# Patient Record
Sex: MALE | Race: WHITE | Employment: FULL TIME | URBAN - METROPOLITAN AREA
[De-identification: names, ages, dates, MRNs, and addresses within clinical notes are randomized per-mention and may not be internally consistent; named-entity substitution may affect disease eponyms.]

---

## 2017-11-01 PROBLEM — S32.010A CLOSED COMPRESSION FRACTURE OF L1 LUMBAR VERTEBRA: Status: ACTIVE | Noted: 2017-11-01

## 2017-11-10 PROBLEM — M53.3 SACROILIAC JOINT PAIN: Status: ACTIVE | Noted: 2017-11-10

## 2018-01-19 PROBLEM — M54.16 RADICULOPATHY, LUMBAR REGION: Status: ACTIVE | Noted: 2018-01-19

## 2018-04-10 ENCOUNTER — TELEPHONE (OUTPATIENT)
Dept: ORTHOPEDIC SURGERY | Age: 63
End: 2018-04-10

## 2018-05-14 ENCOUNTER — HOSPITAL ENCOUNTER (OUTPATIENT)
Dept: NEUROLOGY | Age: 63
Discharge: HOME | End: 2018-05-14
Payer: COMMERCIAL

## 2018-05-14 DIAGNOSIS — S32.010G CLOSED COMPRESSION FRACTURE OF L1 LUMBAR VERTEBRA WITH DELAYED HEALING, SUBSEQUENT ENCOUNTER: ICD-10-CM

## 2018-05-14 DIAGNOSIS — M54.16 RADICULOPATHY, LUMBAR REGION: ICD-10-CM

## 2018-05-14 PROCEDURE — 95911 NRV CNDJ TEST 9-10 STUDIES: CPT

## 2018-05-14 PROCEDURE — 95886 MUSC TEST DONE W/N TEST COMP: CPT

## 2018-05-25 PROBLEM — M51.36 BULGING LUMBAR DISC: Status: ACTIVE | Noted: 2018-05-25

## 2018-07-17 ENCOUNTER — OFFICE VISIT (OUTPATIENT)
Dept: PHYSICAL MEDICINE AND REHAB | Age: 63
End: 2018-07-17
Payer: COMMERCIAL

## 2018-07-17 VITALS
SYSTOLIC BLOOD PRESSURE: 144 MMHG | WEIGHT: 239 LBS | DIASTOLIC BLOOD PRESSURE: 83 MMHG | BODY MASS INDEX: 33.46 KG/M2 | HEIGHT: 71 IN | HEART RATE: 111 BPM

## 2018-07-17 DIAGNOSIS — M21.70 LEG LENGTH DIFFERENCE, ACQUIRED: ICD-10-CM

## 2018-07-17 DIAGNOSIS — M54.41 CHRONIC BILATERAL LOW BACK PAIN WITH BILATERAL SCIATICA: ICD-10-CM

## 2018-07-17 DIAGNOSIS — M54.42 CHRONIC BILATERAL LOW BACK PAIN WITH BILATERAL SCIATICA: ICD-10-CM

## 2018-07-17 DIAGNOSIS — M53.3 SACROILIAC JOINT PAIN: Primary | ICD-10-CM

## 2018-07-17 DIAGNOSIS — M47.816 LUMBAR SPONDYLOSIS: ICD-10-CM

## 2018-07-17 DIAGNOSIS — G89.29 CHRONIC BILATERAL LOW BACK PAIN WITH BILATERAL SCIATICA: ICD-10-CM

## 2018-07-17 PROCEDURE — 99214 OFFICE O/P EST MOD 30 MIN: CPT | Performed by: PHYSICAL MEDICINE & REHABILITATION

## 2018-07-17 RX ORDER — HYDROCHLOROTHIAZIDE 25 MG/1
25 TABLET ORAL DAILY
COMMUNITY
Start: 2018-05-21 | End: 2018-07-31

## 2018-07-17 RX ORDER — PREGABALIN 75 MG/1
75 CAPSULE ORAL 2 TIMES DAILY
Qty: 60 CAPSULE | Refills: 2 | Status: SHIPPED | OUTPATIENT
Start: 2018-07-17 | End: 2018-11-08

## 2018-07-17 RX ORDER — AMLODIPINE BESYLATE 10 MG/1
10 TABLET ORAL DAILY
COMMUNITY
Start: 2018-05-18

## 2018-07-17 NOTE — PROGRESS NOTES
Angel Bowens D.O. [facility]  [address] [facility] [place]  Phone: [phone]  Fax: [phone]      7/17/18    Chief Complaint   Patient presents with    Back Pain     FOLLOW UP       HPI:  Catherine Romero is a 61y.o. year old man seen today in follow up regarding back pain. Interval history: Since the last visit the patient was last seen in December 2017. Since last seen he had SI injection with Dr. Mandy Corrales which lasts only about a month for him. He also had RAMBO which did not help at all. Today, the pain is rated Pain Score:   2 where 0 is no pain and 10 is pain as bad as it can be. The pain is located in the low back,  does not radiate and is described as aching. This pain occurs intermittently. The symptoms have been unchanged since onset. Symptoms are exacerbated by shoveling mulch, riding a raft. Factors which relieve the pain include sacroiliac injection. Other associated symptoms include none. Otherwise, the pain assessment has not changed since the last visit. Past Medical History:   Diagnosis Date    Calculus (=stone)     renal    Environmental allergies     H/O renal calculi     Kidney stones     Migraine     stable with meds    Sleep apnea     uses bipap    Stomach burning chronic    Stomach disorder        Past Surgical History:   Procedure Laterality Date    BACK SURGERY  12/14/2017    kypho L1    CYSTOSCOPY  5/16/2014    retrograde pyelogram uteroscopy    FRACTURE SURGERY Right 09/10/2017    IM nailing right femur    LITHOTRIPSY      OTHER SURGICAL HISTORY Left 1/15/2014    cysto.  ESWL stent placement    TONSILLECTOMY         Social History     Social History    Marital status:      Spouse name: N/A    Number of children: N/A    Years of education: N/A     Social History Main Topics    Smoking status: Current Every Day Smoker     Packs/day: 1.00     Types: Cigarettes    Smokeless tobacco:

## 2018-07-30 ENCOUNTER — TELEPHONE (OUTPATIENT)
Dept: PHYSICAL MEDICINE AND REHAB | Age: 63
End: 2018-07-30

## 2018-07-30 DIAGNOSIS — M21.70 ACQUIRED LEG LENGTH DISCREPANCY: Primary | ICD-10-CM

## 2018-07-30 NOTE — TELEPHONE ENCOUNTER
----- Message from Maggie Tariq DO sent at 7/30/2018 12:56 PM EDT -----  Reviewed test abnormal, inform patient that it showed left leg 5  Mm shorter. Needs an in shoe lift.  if he agrees.

## 2018-07-31 ENCOUNTER — OFFICE VISIT (OUTPATIENT)
Dept: PAIN MANAGEMENT | Age: 63
End: 2018-07-31
Payer: COMMERCIAL

## 2018-07-31 VITALS
WEIGHT: 238 LBS | DIASTOLIC BLOOD PRESSURE: 8 MMHG | TEMPERATURE: 99.6 F | SYSTOLIC BLOOD PRESSURE: 148 MMHG | OXYGEN SATURATION: 98 % | BODY MASS INDEX: 33.32 KG/M2 | RESPIRATION RATE: 16 BRPM | HEART RATE: 98 BPM | HEIGHT: 71 IN

## 2018-07-31 DIAGNOSIS — M47.816 LUMBAR SPONDYLOSIS: ICD-10-CM

## 2018-07-31 DIAGNOSIS — M47.816 LUMBAR FACET ARTHROPATHY: Primary | ICD-10-CM

## 2018-07-31 DIAGNOSIS — G89.4 CHRONIC PAIN SYNDROME: ICD-10-CM

## 2018-07-31 DIAGNOSIS — M53.3 DISORDER OF SACRUM: ICD-10-CM

## 2018-07-31 PROBLEM — M21.70 ACQUIRED LEG LENGTH DISCREPANCY: Status: ACTIVE | Noted: 2018-07-31

## 2018-07-31 PROCEDURE — 99204 OFFICE O/P NEW MOD 45 MIN: CPT | Performed by: PAIN MEDICINE

## 2018-07-31 NOTE — PROGRESS NOTES
[address]        [address] [facility], [address] [facility]      [phone]          Consult Note      Patient:  JOHN Davis 1955    Date of Service:  18    Requesting Physician:  Yash Quintanilla DO    Reason for Consult:      Patient presents with complaints of low back/Left buttocks and bilateral leg pain that started 1 years ago after a fall    HISTORY OF PRESENT ILLNESS:      Pain is constant and is described as aching and dull. Pain does not radiate. He  has numbness of both legs and does not have bladder or bowel dysfunction. Alleviating factors include: sitting. Aggravating factors include:  bending, lifting. He has not been on anticoagulation medications to include none and has not been on herbal supplements. He is not diabetic. Imaging:   Lumbar spine MRI 2018  Interval L1 kyphoplasty. The degree of vertebral body collapse is essentially unchanged. There is persistent marrow edema throughout the vertebral body which may be postoperative in nature  Stable mild diffuse spondylosis. No evidence of a focal herniation or significant central canal stenosis at any level  Progressing cystic space occyping lesion within the upper po;e the right kidney  EMG of bilateral lower extremities 2018  #1 diagnostic evidence compatible with the right L5 nerve root   pathology, i.e. Radiculopathy    #2 evidence compatible with axonal peripheral neuropathy, mild   affecting both lower extremities. Previous treatments: Physical Therapy, SIJ injection and medications. .      Past Medical History:   Diagnosis Date    Calculus (=stone)     renal    Environmental allergies     H/O renal calculi     Hypertension     Kidney stones     Migraine     stable with meds    Sleep apnea     uses bipap    Sleep apnea     Stomach burning chronic    Stomach disorder        Past Surgical History:   Procedure Laterality Date    BACK SURGERY 12/14/2017    kypho L1    CYSTOSCOPY  5/16/2014    retrograde pyelogram uteroscopy    FRACTURE SURGERY Right 09/10/2017    IM nailing right femur    LITHOTRIPSY      OTHER SURGICAL HISTORY Left 1/15/2014    cysto. ESWL stent placement    TONSILLECTOMY         Prior to Admission medications    Medication Sig Start Date End Date Taking? Authorizing Provider   amLODIPine (NORVASC) 10 MG tablet Take 10 mg by mouth daily 5/18/18  Yes Historical Provider, MD   pregabalin (LYRICA) 75 MG capsule Take 1 capsule by mouth 2 times daily for 30 days. . 7/17/18 8/16/18 Yes Racheal Jean Baptiste,    Topiramate (TOPAMAX PO) Take by mouth nightly    Yes Historical Provider, MD   sertraline (ZOLOFT) 100 MG tablet Take 100 mg by mouth nightly    Yes Historical Provider, MD   montelukast (SINGULAIR) 10 MG tablet Take 10 mg by mouth nightly. Yes Historical Provider, MD   sucralfate (CARAFATE) 1 GM tablet Take 1 g by mouth daily    Yes Historical Provider, MD       Allergies   Allergen Reactions    Pcn [Penicillins]      unknown    Pcn [Penicillins]      Unknown reaction, told of allergy as child. Social History     Social History    Marital status:      Spouse name: N/A    Number of children: N/A    Years of education: N/A     Occupational History    Not on file. Social History Main Topics    Smoking status: Current Every Day Smoker     Packs/day: 1.00     Types: Cigarettes    Smokeless tobacco: Never Used    Alcohol use 21.0 oz/week     35 Cans of beer per week    Drug use: No    Sexual activity: Not Currently     Other Topics Concern    Not on file     Social History Narrative    ** Merged History Encounter **            History reviewed. No pertinent family history. REVIEW OF SYSTEMS:     Patient specifically denies fever/chills, chest pain, shortness of breath, new bowel or bladder complaints. All other review of systems was negative.     PHYSICAL EXAMINATION:      BP (!) 148/8   Pulse 98   Temp

## 2018-07-31 NOTE — PROGRESS NOTES
7/31/2018    Luis Artist presents to the [address] [place] [address] on 7/31/2018. Keira Cmobs is complaining of pain lower back and legs. The pain is constant. The pain is described as aching, dull, numb and tingling. . Pain is rated today at a 2 on the VAS scale.  He took his last dose of Lyrica this AM.      He has not been on anticoagulation medications to include none and is managed by NA.      BP (!) 148/8   Pulse 98   Temp 99.6 °F (37.6 °C) (Oral)   Resp 16   Ht 5' 11\" (1.803 m)   Wt 238 lb (108 kg)   SpO2 98%   BMI 33.19 kg/m²

## 2018-09-28 ENCOUNTER — OFFICE VISIT (OUTPATIENT)
Dept: PAIN MANAGEMENT | Age: 63
End: 2018-09-28
Payer: COMMERCIAL

## 2018-09-28 VITALS
OXYGEN SATURATION: 96 % | DIASTOLIC BLOOD PRESSURE: 82 MMHG | HEART RATE: 95 BPM | SYSTOLIC BLOOD PRESSURE: 138 MMHG | RESPIRATION RATE: 16 BRPM | TEMPERATURE: 98.9 F

## 2018-09-28 DIAGNOSIS — M51.36 BULGING LUMBAR DISC: ICD-10-CM

## 2018-09-28 DIAGNOSIS — M47.816 LUMBAR FACET ARTHROPATHY: ICD-10-CM

## 2018-09-28 DIAGNOSIS — M47.816 LUMBAR SPONDYLOSIS: ICD-10-CM

## 2018-09-28 DIAGNOSIS — M53.3 DISORDER OF SACRUM: ICD-10-CM

## 2018-09-28 DIAGNOSIS — G89.4 CHRONIC PAIN SYNDROME: Primary | ICD-10-CM

## 2018-09-28 PROCEDURE — 99213 OFFICE O/P EST LOW 20 MIN: CPT | Performed by: PAIN MEDICINE

## 2018-09-28 NOTE — PROGRESS NOTES
9/28/2018    Christell Alpers presents to the [address] [place] [address] on 9/28/2018. Maximiliano Morocho is complaining of pain in lower back. R knee is hurting laterly. The pain is constant. The pain is described as stabbing and gnawing. Pain is rated today at a 2 on the VAS scale. He took his last dose of nothing at this time, Ibuprofen PRN.       He has not been on anticoagulation medications to include none and is managed by NA.      /82   Pulse 95   Temp 98.9 °F (37.2 °C) (Oral)   Resp 16   SpO2 96%

## 2018-09-28 NOTE — PROGRESS NOTES
[address]  [address] [facility], [address] [place] [address] [place]  [phone]    Follow up Note      Theresa Bond     Date of Visit:  9/28/2018    CC:  Patient presents for follow up   Chief Complaint   Patient presents with    Lower Back Pain       HPI:    Pain is unchanged. Change in quality of symptoms:no. Medication side effects:not applicable . Recent diagnostic testing:none. Recent interventional procedures:none. .    He has not been on anticoagulation medications to include none and has not been on herbal supplements. He is not diabetic.     Imaging:   Lumbar spine MRI 01/2018  Interval L1 kyphoplasty. The degree of vertebral body collapse is essentially unchanged. There is persistent marrow edema throughout the vertebral body which may be postoperative in nature  Stable mild diffuse spondylosis. No evidence of a focal herniation or significant central canal stenosis at any level  Progressing cystic space occyping lesion within the upper po;e the right kidney  EMG of bilateral lower extremities 05/2018  #1 diagnostic evidence compatible with the right L5 nerve root   pathology, i.e. Radiculopathy    #2 evidence compatible with axonal peripheral neuropathy, mild   affecting both lower extremities.     Previous treatments: Physical Therapy, SIJ injection and medications. .         Potential Aberrant Drug-Related Behavior:  No    Urine Drug Screening:  None no opioids started     OARRS report:  09/2018 consistent    Past Medical History:   Diagnosis Date    Calculus (=stone)     renal    Environmental allergies     H/O renal calculi     Hypertension     Kidney stones     Migraine     stable with meds    Sleep apnea     uses bipap    Sleep apnea     Stomach burning chronic    Stomach disorder        Past Surgical History:   Procedure Laterality Date    BACK SURGERY  12/14/2017    kypho L1    CYSTOSCOPY  5/16/2014    retrograde pyelogram uteroscopy    FRACTURE SURGERY Right 09/10/2017    IM nailing right femur    LITHOTRIPSY      OTHER SURGICAL HISTORY Left 1/15/2014    cysto. ESWL stent placement    TONSILLECTOMY         Prior to Admission medications    Medication Sig Start Date End Date Taking? Authorizing Provider   amLODIPine (NORVASC) 10 MG tablet Take 10 mg by mouth daily 5/18/18  Yes Historical Provider, MD   Topiramate (TOPAMAX PO) Take by mouth nightly    Yes Historical Provider, MD   sertraline (ZOLOFT) 100 MG tablet Take 100 mg by mouth nightly    Yes Historical Provider, MD   montelukast (SINGULAIR) 10 MG tablet Take 10 mg by mouth nightly. Yes Historical Provider, MD   sucralfate (CARAFATE) 1 GM tablet Take 1 g by mouth daily    Yes Historical Provider, MD   pregabalin (LYRICA) 75 MG capsule Take 1 capsule by mouth 2 times daily for 30 days. . 7/17/18 8/16/18  Renard Charles Jean Baptiste, DO       Allergies   Allergen Reactions    Pcn [Penicillins]      unknown    Pcn [Penicillins]      Unknown reaction, told of allergy as child. Social History     Social History    Marital status:      Spouse name: N/A    Number of children: N/A    Years of education: N/A     Occupational History    Not on file. Social History Main Topics    Smoking status: Current Every Day Smoker     Packs/day: 1.00     Types: Cigarettes    Smokeless tobacco: Never Used    Alcohol use 21.0 oz/week     35 Cans of beer per week    Drug use: No    Sexual activity: Not Currently     Other Topics Concern    Not on file     Social History Narrative    ** Merged History Encounter **            History reviewed. No pertinent family history. REVIEW OF SYSTEMS:     Carbondalemonet Horta denies fever/chills, chest pain, shortness of breath, new bowel or bladder complaints. All other review of systems was negative. PHYSICAL EXAMINATION:      /82   Pulse 95   Temp 98.9 °F (37.2 °C) (Oral)   Resp 16   SpO2 96%     General:       General appearance:   pleasant and well-hydrated. , in mild distress and A & O x3  Build:Overweight  Function:Rises from a seated position with difficulty     HEENT:     Head:normocephalic and atraumatic  Pupils:regular, round and equal.  Sclera: icterus absent,      Lungs:     Breathing:Breathing Pattern: regular, no distress     Abdomen:     Shape:obese and non-distended  Tenderness:none  Guarding:none     Lumbar spine:     Spine inspection:normal   CVA tenderness:No   Palpation:facet loading, left and positive.   Range of motion:abnormal moderately Lateral bending, flexion, extension rotation left and is  painful.     Musculoskeletal:     Trigger points in Paraveteral:absent bilaterally  SI joint tenderness:negative right, positive left              HEMA test:negative right, positive             left  Piriformis tenderness:negative right, negative left  Trochanteric bursa tenderness:negative right, negative left  SLR:negative right, negative left, sitting      Extremities:     Tremors:None bilaterally upper and lower  Range of motion:pain with internal rotation of hip mildly positive right   Intact:Yes  Edema:Normal     Neurological:     Sensory:normal to light touch and a pin prick bilateral lower extremities  Motor:                  Right Quadriceps5/5          Left Quadriceps5/5           Right Gastrocnemius5/5    Left Gastrocnemius5/5  Right Ant Tibialis5/5  Left Ant Tibialis5/5  Reflexes:    Right Quadriceps reflex2+  Left Quadriceps reflex2+  Right Achilles reflex0  Left Achilles reflex0  Gait:antalgic Left leg is shorter than Right      Dermatology:     Skin:no unusual rashes and no skin lesions    Assessment/Plan:  Chronic low back and Left buttocks pain consistent with lumbar facet arthritis type pain and Left sacroiliac joint dysfunction  Patient had seen  and had Left SIJ injection which per patient had helped a lot but it didn't last   Patient is got fitted for a Left shoe lift, per patient it had helped but it didn't last   Will schedule

## 2018-10-17 ENCOUNTER — PREP FOR PROCEDURE (OUTPATIENT)
Dept: PAIN MANAGEMENT | Age: 63
End: 2018-10-17

## 2018-10-18 ENCOUNTER — HOSPITAL ENCOUNTER (OUTPATIENT)
Age: 63
Setting detail: OUTPATIENT SURGERY
Discharge: HOME | End: 2018-10-18
Attending: PAIN MEDICINE | Admitting: PAIN MEDICINE
Payer: COMMERCIAL

## 2018-10-18 ENCOUNTER — HOSPITAL ENCOUNTER (OUTPATIENT)
Dept: OPERATING ROOM | Age: 63
Setting detail: OUTPATIENT SURGERY
Discharge: HOME | End: 2018-10-18
Attending: PAIN MEDICINE
Payer: COMMERCIAL

## 2018-10-18 VITALS
OXYGEN SATURATION: 96 % | HEART RATE: 84 BPM | RESPIRATION RATE: 16 BRPM | SYSTOLIC BLOOD PRESSURE: 155 MMHG | DIASTOLIC BLOOD PRESSURE: 86 MMHG

## 2018-10-18 DIAGNOSIS — M53.3 DISORDER OF SACRUM: ICD-10-CM

## 2018-10-18 PROCEDURE — 6360000002 HC RX W HCPCS: Performed by: PAIN MEDICINE

## 2018-10-18 PROCEDURE — 27096 INJECT SACROILIAC JOINT: CPT | Performed by: PAIN MEDICINE

## 2018-10-18 PROCEDURE — 2709999900 HC NON-CHARGEABLE SUPPLY: Performed by: PAIN MEDICINE

## 2018-10-18 PROCEDURE — 3600000005 HC SURGERY LEVEL 5 BASE: Performed by: PAIN MEDICINE

## 2018-10-18 PROCEDURE — 2500000003 HC RX 250 WO HCPCS: Performed by: PAIN MEDICINE

## 2018-10-18 PROCEDURE — 3209999900 FLUORO FOR SURGICAL PROCEDURES

## 2018-10-18 PROCEDURE — 6360000004 HC RX CONTRAST MEDICATION: Performed by: PAIN MEDICINE

## 2018-10-18 PROCEDURE — 7100000010 HC PHASE II RECOVERY - FIRST 15 MIN: Performed by: PAIN MEDICINE

## 2018-10-18 RX ORDER — LIDOCAINE HYDROCHLORIDE 5 MG/ML
INJECTION, SOLUTION INFILTRATION; INTRAVENOUS PRN
Status: DISCONTINUED | OUTPATIENT
Start: 2018-10-18 | End: 2018-10-18 | Stop reason: HOSPADM

## 2018-10-18 ASSESSMENT — PAIN SCALES - GENERAL
PAINLEVEL_OUTOF10: 0
PAINLEVEL_OUTOF10: 0

## 2018-10-18 ASSESSMENT — PAIN - FUNCTIONAL ASSESSMENT: PAIN_FUNCTIONAL_ASSESSMENT: 0-10

## 2018-10-18 NOTE — H&P
Occupational History    Not on file. Social History Main Topics    Smoking status: Current Every Day Smoker     Packs/day: 1.00     Types: Cigarettes    Smokeless tobacco: Never Used    Alcohol use 21.0 oz/week     35 Cans of beer per week    Drug use: No    Sexual activity: Not Currently     Other Topics Concern    Not on file     Social History Narrative    ** Merged History Encounter **            No family history on file. REVIEW OF SYSTEMS:    CONSTITUTIONAL:  negative for  fevers, chills, sweats and fatigue    RESPIRATORY:  negative for  dry cough, cough with sputum, dyspnea, wheezing and chest pain    CARDIOVASCULAR:  negative for chest pain, dyspnea, palpitations, syncope    GASTROINTESTINAL:  negative for nausea, vomiting, change in bowel habits, diarrhea, constipation and abdominal pain    MUSCULOSKELETAL: negative for muscle weakness    SKIN: negative for itching or rashes. BEHAVIOR/PSYCH:  negative for poor appetite, increased appetite, decreased sleep and poor concentration    All other systems negative      PHYSICAL EXAM:    VITALS:  BP (!) 167/89   Pulse 84   Resp 18   SpO2 95%     CONSTITUTIONAL:  awake, alert, cooperative, no apparent distress, and appears stated age    EYES: PERRLA, EOMI    LUNGS:  No increased work of breathing, no audible wheezing    CARDIOVASCULAR:  regular rate and rhythm    ABDOMEN:  Soft non tender non distended     EXTREMITIES: no signs of clubbing or cyanosis. MUSCULOSKELETAL: negative for flaccid muscle tone or spastic movements. SKIN: gross examination reveals no signs of rashes, or diaphoresis. NEURO: Cranial nerves II-XII grossly intact. No signs of agitated mood.        Assessment/Plan:    Left buttocks pain for Left SIJ injection

## 2018-10-18 NOTE — OP NOTE
injected. The  Joint space was appropriately outlined. Then, after negative aspiration, a solution consisiting of 0.25% marcaine 2 cc and 40 mg DepoMedrol was easily injected. The needle was then removed and the needle insertion site was covered with Band-Aid. Disposition the patient tolerated the procedure well and there were no complications . Vital signs remained stable throughout the procedure. The patient was escorted to the recovery area where they remained until discharge and written discharge instructions for the procedure were given. Plan: Asya Lynch will return to our pain management center as scheduled.      Tammy Ospina MD

## 2018-11-08 ENCOUNTER — OFFICE VISIT (OUTPATIENT)
Dept: PAIN MANAGEMENT | Age: 63
End: 2018-11-08
Payer: COMMERCIAL

## 2018-11-08 ENCOUNTER — PREP FOR PROCEDURE (OUTPATIENT)
Dept: PAIN MANAGEMENT | Age: 63
End: 2018-11-08

## 2018-11-08 VITALS
TEMPERATURE: 100 F | WEIGHT: 240 LBS | RESPIRATION RATE: 18 BRPM | SYSTOLIC BLOOD PRESSURE: 144 MMHG | HEIGHT: 71 IN | HEART RATE: 92 BPM | DIASTOLIC BLOOD PRESSURE: 80 MMHG | BODY MASS INDEX: 33.6 KG/M2 | OXYGEN SATURATION: 97 %

## 2018-11-08 DIAGNOSIS — M53.3 DISORDER OF SACRUM: Primary | ICD-10-CM

## 2018-11-08 DIAGNOSIS — M47.816 LUMBAR FACET ARTHROPATHY: ICD-10-CM

## 2018-11-08 DIAGNOSIS — M47.816 LUMBAR SPONDYLOSIS: ICD-10-CM

## 2018-11-08 DIAGNOSIS — M53.3 DISORDER OF SACRUM: ICD-10-CM

## 2018-11-08 DIAGNOSIS — M51.36 BULGING LUMBAR DISC: Primary | ICD-10-CM

## 2018-11-08 DIAGNOSIS — G89.4 CHRONIC PAIN SYNDROME: ICD-10-CM

## 2018-11-08 PROCEDURE — 99213 OFFICE O/P EST LOW 20 MIN: CPT | Performed by: PAIN MEDICINE

## 2018-11-08 RX ORDER — SODIUM CHLORIDE 0.9 % (FLUSH) 0.9 %
10 SYRINGE (ML) INJECTION EVERY 12 HOURS SCHEDULED
Status: CANCELLED | OUTPATIENT
Start: 2018-11-08

## 2018-11-08 RX ORDER — SODIUM CHLORIDE 0.9 % (FLUSH) 0.9 %
10 SYRINGE (ML) INJECTION PRN
Status: CANCELLED | OUTPATIENT
Start: 2018-11-08

## 2018-11-08 NOTE — PROGRESS NOTES
Raya Lam presents to the [address] [address] on 11/8/2018. Asya Lynch is complaining of pain in his low back into his left buttock . The pain is constant. The pain is described as aching and dull. Pain is rated today at a 4 on the VAS scale. He took his last dose of Tyleol. He has not been on anticoagulation medications to include none and is managed by NA.      BP (!) 144/80   Pulse 92   Temp 100 °F (37.8 °C)   Resp 18   Ht 5' 11\" (1.803 m)   Wt 240 lb (108.9 kg)   SpO2 97%   BMI 33.47 kg/m²      10/18/18    PROCEDURE:  Fluoroscopic guided Left   sacroiliac joint injection with steroid (#1).       CHELSY Douglas

## 2020-07-28 ENCOUNTER — OFFICE VISIT (OUTPATIENT)
Dept: PHYSICAL MEDICINE AND REHAB | Age: 65
End: 2020-07-28
Payer: MEDICARE

## 2020-07-28 VITALS
SYSTOLIC BLOOD PRESSURE: 152 MMHG | WEIGHT: 237 LBS | HEART RATE: 82 BPM | BODY MASS INDEX: 33.18 KG/M2 | DIASTOLIC BLOOD PRESSURE: 82 MMHG | HEIGHT: 71 IN

## 2020-07-28 PROCEDURE — 3017F COLORECTAL CA SCREEN DOC REV: CPT | Performed by: PHYSICAL MEDICINE & REHABILITATION

## 2020-07-28 PROCEDURE — G8417 CALC BMI ABV UP PARAM F/U: HCPCS | Performed by: PHYSICAL MEDICINE & REHABILITATION

## 2020-07-28 PROCEDURE — 4004F PT TOBACCO SCREEN RCVD TLK: CPT | Performed by: PHYSICAL MEDICINE & REHABILITATION

## 2020-07-28 PROCEDURE — G8427 DOCREV CUR MEDS BY ELIG CLIN: HCPCS | Performed by: PHYSICAL MEDICINE & REHABILITATION

## 2020-07-28 PROCEDURE — 1123F ACP DISCUSS/DSCN MKR DOCD: CPT | Performed by: PHYSICAL MEDICINE & REHABILITATION

## 2020-07-28 PROCEDURE — 4040F PNEUMOC VAC/ADMIN/RCVD: CPT | Performed by: PHYSICAL MEDICINE & REHABILITATION

## 2020-07-28 PROCEDURE — 99214 OFFICE O/P EST MOD 30 MIN: CPT | Performed by: PHYSICAL MEDICINE & REHABILITATION

## 2020-07-28 NOTE — PROGRESS NOTES
Thalia Us D.O. [place] [facility]  [address] [facility] [place]  Phone: [phone]  Fax: [phone]        7/28/20    Chief Complaint   Patient presents with    Back Pain     FOLLOW UP       HPI:  Osei Yarbrough is a 72y.o. year old woman seen today in follow up regarding low back pain. Interval history: Since the last visit the patient saw pain management and had left sacroiliac injection and was planning for ablation but his insurance denied. He has new insurance now. Today, the pain is rated Pain Score:   7 where 0 is no pain and 10 is pain as bad as it can be. The pain is located in the low back,  radiates distally to the bilateral legs, and is described as aching. This pain occurs all day. The symptoms have been worse since onset. Symptoms are exacerbated by walking. Factors which relieve the pain include nothing. Other associated symptoms include stiffness. Otherwise, the pain assessment has not changed since the last visit. Past Medical History:   Diagnosis Date    Calculus (=stone)     renal    Environmental allergies     H/O renal calculi     Hypertension     Kidney stones     Migraine     stable with meds    Sleep apnea     uses bipap    Sleep apnea     Stomach burning chronic    Stomach disorder        Past Surgical History:   Procedure Laterality Date    BACK SURGERY  12/14/2017    kypho L1    CYSTOSCOPY  5/16/2014    retrograde pyelogram uteroscopy    FRACTURE SURGERY Right 09/10/2017    IM nailing right femur    LITHOTRIPSY      NERVE BLOCK Left 10/18/2018    S I joint    OTHER SURGICAL HISTORY Left 1/15/2014    cysto.  ESWL stent placement    MO INJECT SI JOINT ARTHRGRPHY&/ANES/STEROID W/IMAGE Left 10/18/2018    LEFT STEROID INJECTION JOINT INJECTION performed by Keeley Rivera MD at [address] [place] History     Tobacco Use    Smoking status: Current Every Day Smoker     Packs/day: 1.00     Types: Cigarettes    Smokeless tobacco: Never Used   Substance Use Topics    Alcohol use: Yes     Alcohol/week: 35.0 standard drinks     Types: 35 Cans of beer per week    Drug use: No       History reviewed. No pertinent family history. Current Outpatient Medications   Medication Sig Dispense Refill    amLODIPine (NORVASC) 10 MG tablet Take 10 mg by mouth daily      Topiramate (TOPAMAX PO) Take by mouth nightly       sertraline (ZOLOFT) 100 MG tablet Take 100 mg by mouth nightly       montelukast (SINGULAIR) 10 MG tablet Take 10 mg by mouth nightly.  sucralfate (CARAFATE) 1 GM tablet Take 1 g by mouth daily        No current facility-administered medications for this visit. Allergies   Allergen Reactions    Pcn [Penicillins]      unknown    Pcn [Penicillins]      Unknown reaction, told of allergy as child. Review of Systems:  No new weakness, paresthesia, incontinence of bowel or bladder, saddle anesthesia, falls or gait dysfunction. Otherwise, per HPI. Physical Exam:   Blood pressure (!) 152/82, pulse 82, height 5' 11\" (1.803 m), weight 237 lb (107.5 kg). GENERAL: The patient is in no apparent distress. Body habitus is non-obese. HEENT: No rhinorrhea, sneezing, yawning, or lacrimation. No scleral icterus or conjunctival injection. SKIN: No piloerection. No tract marks. No rash. PSYCH: Mood and affect are appropriate. Hygiene is appropriate. CARDIOVASCULAR  Heart is regular rate and rhythm. There is no edema. RESPIRATORY: Respirations are regular and unlabored. There is no cyanosis. GASTROINTESTINAL: Soft abdomen, non-tender. MSK: There is no joint effusion, deformity, instability, swelling, erythema or warmth. AROM is full in the spine and extremities. Spinal curvatures are normal. Tender to palpation bilateral lumbar paraspinals and bilateral SI joints. NEURO: Gait is normal. No focal sensorimotor deficit.   Reflexes 2+ and symmetric in lower extremities. Impression:   1. Sacroiliac joint pain    2. Radiculopathy, lumbar region    3. Closed compression fracture of first lumbar vertebra, sequela    4. Closed fracture of right femur with routine healing, unspecified fracture morphology, unspecified portion of femur, subsequent encounter        Plan:  Orders Placed This Encounter   Procedures    External Referral To Pain Clinic     Referral Priority:   Routine     Referral Type:   Eval and Treat     Referral Reason:   Specialty Services Required     Requested Specialty:   Pain Management     Number of Visits Requested:   Zoya Damon MD, Pain Medicine, [place]     Referral Priority:   Routine     Referral Type:   Eval and Treat     Referral Reason:   Specialty Services Required     Referred to Provider:   Karin Quan MD     Requested Specialty:   PAIN MEDICINE INTERVENTIONAL PAIN MEDICINE     Number of Visits Requested:   1     As explained to patient at the last visit, he is beyond the point that I have anything to offer him. He has exhausted conservative care. He was referred to pain management but then stopped going after I last saw him due to insurance. He now is requesting two pain management opinions. The patient was educated about the diagnosis, prognosis, indications, risks and benefits of treatment. An opportunity to ask questions was given to the patient and questions were answered. The patient agreed to proceed with the recommended treatment as described above. No follow up needed. Thank you for allowing me to participate in the care of your patient. Spring Pascual D.O., P.T.   Board Certified Physical Medicine and Rehabilitation  Board Certified Electrodiagnostic Medicine

## 2020-08-11 ENCOUNTER — OFFICE VISIT (OUTPATIENT)
Dept: PAIN MANAGEMENT | Age: 65
End: 2020-08-11
Payer: MEDICARE

## 2020-08-11 ENCOUNTER — PREP FOR PROCEDURE (OUTPATIENT)
Dept: PAIN MANAGEMENT | Age: 65
End: 2020-08-11

## 2020-08-11 VITALS
DIASTOLIC BLOOD PRESSURE: 78 MMHG | BODY MASS INDEX: 33.18 KG/M2 | TEMPERATURE: 98.9 F | WEIGHT: 237 LBS | HEART RATE: 80 BPM | HEIGHT: 71 IN | RESPIRATION RATE: 16 BRPM | SYSTOLIC BLOOD PRESSURE: 152 MMHG

## 2020-08-11 PROBLEM — M51.9 LUMBAR DISC DISORDER: Status: ACTIVE | Noted: 2020-08-11

## 2020-08-11 PROCEDURE — 1123F ACP DISCUSS/DSCN MKR DOCD: CPT | Performed by: PAIN MEDICINE

## 2020-08-11 PROCEDURE — 3017F COLORECTAL CA SCREEN DOC REV: CPT | Performed by: PAIN MEDICINE

## 2020-08-11 PROCEDURE — G8427 DOCREV CUR MEDS BY ELIG CLIN: HCPCS | Performed by: PAIN MEDICINE

## 2020-08-11 PROCEDURE — G8417 CALC BMI ABV UP PARAM F/U: HCPCS | Performed by: PAIN MEDICINE

## 2020-08-11 PROCEDURE — 99213 OFFICE O/P EST LOW 20 MIN: CPT | Performed by: PAIN MEDICINE

## 2020-08-11 PROCEDURE — 99214 OFFICE O/P EST MOD 30 MIN: CPT | Performed by: PAIN MEDICINE

## 2020-08-11 PROCEDURE — 4040F PNEUMOC VAC/ADMIN/RCVD: CPT | Performed by: PAIN MEDICINE

## 2020-08-11 PROCEDURE — 4004F PT TOBACCO SCREEN RCVD TLK: CPT | Performed by: PAIN MEDICINE

## 2020-08-11 NOTE — PROGRESS NOTES
[address] [place] [address], [address] [place] [address] Jose  [phone]    Follow up Note      Yury Lam     Date of Visit:  8/11/2020    CC:  Patient presents for follow up   Chief Complaint   Patient presents with    Follow-up    Back Pain       HPI:  Patient is here to re-establish care with the practice with complaints of low back pain with no radiation. Patient was last seen by me 11/2018. Medication side effects:not applicable . Recent diagnostic testing:none. Recent interventional procedures:none    He has not been on anticoagulation medications to include none and has not been on herbal supplements. He is not diabetic.     Imaging:     Lumbar spine MRI 01/2018  Interval L1 kyphoplasty. The degree of vertebral body collapse is essentially unchanged. There is persistent marrow edema throughout the vertebral body which may be postoperative in nature  Stable mild diffuse spondylosis. No evidence of a focal herniation or significant central canal stenosis at any level  Progressing cystic space occyping lesion within the upper pole the right kidney    EMG of bilateral lower extremities 05/2018  #1 diagnostic evidence compatible with the right L5 nerve root   pathology, i.e. Radiculopathy    #2 evidence compatible with axonal peripheral neuropathy, mild   affecting both lower extremities.     Previous treatments: Physical Therapy, SIJ injection and medications. .         Potential Aberrant Drug-Related Behavior:  No    Urine Drug Screening:  None no opioids started     OARRS report:  08/2020 consistent    Past Medical History:   Diagnosis Date    Calculus (=stone)     renal    Environmental allergies     H/O renal calculi     Hypertension     Kidney stones     Migraine     stable with meds    Sleep apnea     uses bipap    Sleep apnea     Stomach burning chronic    Stomach disorder      Past Surgical History:   Procedure Laterality Date    BACK SURGERY 12/14/2017    kypho L1    CYSTOSCOPY  5/16/2014    retrograde pyelogram uteroscopy    FRACTURE SURGERY Right 09/10/2017    IM nailing right femur    LITHOTRIPSY      NERVE BLOCK Left 10/18/2018    S I joint    OTHER SURGICAL HISTORY Left 1/15/2014    cysto. ESWL stent placement    VA INJECT SI JOINT ARTHRGRPHY&/ANES/STEROID W/IMAGE Left 10/18/2018    LEFT STEROID INJECTION JOINT INJECTION performed by Damián Santana MD at [facility]       Prior to Admission medications    Medication Sig Start Date End Date Taking? Authorizing Provider   amLODIPine (NORVASC) 10 MG tablet Take 10 mg by mouth daily 5/18/18  Yes Historical Provider, MD   Topiramate (TOPAMAX PO) Take by mouth nightly    Yes Historical Provider, MD   sertraline (ZOLOFT) 100 MG tablet Take 100 mg by mouth nightly    Yes Historical Provider, MD   montelukast (SINGULAIR) 10 MG tablet Take 10 mg by mouth nightly. Yes Historical Provider, MD   sucralfate (CARAFATE) 1 GM tablet Take 1 g by mouth daily    Yes Historical Provider, MD     Allergies   Allergen Reactions    Pcn [Penicillins]      unknown    Pcn [Penicillins]      Unknown reaction, told of allergy as child. Social History     Socioeconomic History    Marital status:      Spouse name: Not on file    Number of children: Not on file    Years of education: Not on file    Highest education level: Not on file   Occupational History    Not on file   Social Needs    Financial resource strain: Not on file    Food insecurity     Worry: Not on file     Inability: Not on file    Transportation needs     Medical: Not on file     Non-medical: Not on file   Tobacco Use    Smoking status: Current Every Day Smoker     Packs/day: 1.00     Types: Cigarettes    Smokeless tobacco: Never Used   Substance and Sexual Activity    Alcohol use:  Yes     Alcohol/week: 35.0 standard drinks     Types: 35 Cans of beer per week    Drug use: No    Sexual activity: Not Currently Lifestyle    Physical activity     Days per week: Not on file     Minutes per session: Not on file    Stress: Not on file   Relationships    Social connections     Talks on phone: Not on file     Gets together: Not on file     Attends Rastafari service: Not on file     Active member of club or organization: Not on file     Attends meetings of clubs or organizations: Not on file     Relationship status: Not on file    Intimate partner violence     Fear of current or ex partner: Not on file     Emotionally abused: Not on file     Physically abused: Not on file     Forced sexual activity: Not on file   Other Topics Concern    Not on file   Social History Narrative    ** Merged History Encounter **          No family history on file. REVIEW OF SYSTEMS:     Ender House denies fever/chills, chest pain, shortness of breath, new bowel or bladder complaints. All other review of systems was negative. PHYSICAL EXAMINATION:      BP (!) 152/78   Pulse 80   Temp 98.9 °F (37.2 °C) (Oral)   Resp 16   Ht 5' 11\" (1.803 m)   Wt 237 lb (107.5 kg)   BMI 33.05 kg/m²     General:       General appearance:   pleasant and well-hydrated. , in mild distress and A & O x3  Build:Overweight  Function:Rises from a seated position with difficulty     HEENT:     Head:normocephalic and atraumatic  Sclera: icterus absent,      Lungs:     Breathing:Breathing Pattern: regular, no distress     Abdomen:     Shape:obese and non-distended  Tenderness:none     Lumbar spine:     Spine inspection:normal   CVA tenderness:No   Palpation:facet loading, left and positive.   Range of motion:abnormal moderately Lateral bending, flexion, extension rotation left and is  painful.     Musculoskeletal:     Trigger points in Paraveteral:absent bilaterally  SI joint tenderness:negative right, negative left              HEMA test:negative right, negative left  Piriformis tenderness:negative right, negative left  Trochanteric bursa tenderness:negative right, negative left  SLR:negative right, negative left, sitting      Extremities:     Tremors:None bilaterally upper and lower  Range of motion:pain with internal rotation of hip mildly positive right   Intact:Yes  Edema:Normal     Neurological:     Sensory:normal to light touch and a pin prick bilateral lower extremities  Motor:                  Right Quadriceps5/5          Left Quadriceps5/5           Right Gastrocnemius5/5    Left Gastrocnemius5/5  Right Ant Tibialis5/5  Left Ant Tibialis5/5  Reflexes:    Right Quadriceps reflex2+  Left Quadriceps reflex2+  Right Achilles reflex +1  Left Achilles reflex0  Gait:antalgic Left leg is shorter than Right      Dermatology:     Skin:no unusual rashes and no skin lesions    Assessment/Plan:  Chronic low back and Left buttocks pain consistent with lumbar facet arthritis type pain and Left sacroiliac joint dysfunction  Patient had seen  and had Left SIJ injection which per patient had helped a lot but it didn't last   L1 Kyphoplasty. Plan:  Patient is here to re-establish care with the practice with complaints of Left sided low back pain with no radiation. Patient was last seen by me 11/2018. Reviewed lumbar spine imaging studies and discussed with the patient (lumbar facet arthropathy)  Discussed diagnostic facet MBB. Will schedule for Left L3,4 MB and L5 DR block  Continue with OTC pain medications(not a candidate for opioids)  OARRS report reviewed 08/2020. Patient encouraged to stay active and to lose weight. Treatment plan discussed with the patient including procedure side effects. ccreferring sonal Beth M.D.

## 2020-08-11 NOTE — PROGRESS NOTES
Do you currently have any of the following:    Fever: No  Headache:  No  Cough: No  Shortness of breath: No  Exposed to anyone with these symptoms: No                                                                                                                Kip Older presents to the [facility] on 8/11/2020. Shira Ojeda is complaining of pain low back pain. The pain is constant. The pain is described as colicky, stabbing, dull, tender and miserable. Pain is rated on his best day at a 7, on his worst day at a 9, and on average at a 8 on the VAS scale. He took his last dose of Tylenol yesterday. Shira Ojeda does not have issues with constipation. Any procedures since your last visit: No    He is not on NSAIDS and  is not on anticoagulation medications to include none. Pacemaker or defibrilator: No.       BP (!) 152/78   Pulse 80   Temp 98.9 °F (37.2 °C) (Oral)   Resp 16   Ht 5' 11\" (1.803 m)   Wt 237 lb (107.5 kg)   BMI 33.05 kg/m²      No LMP for male patient.

## 2020-11-03 PROBLEM — M47.816 LUMBAR FACET ARTHROPATHY: Status: RESOLVED | Noted: 2018-07-31 | Resolved: 2020-11-03

## 2024-08-30 ENCOUNTER — APPOINTMENT (OUTPATIENT)
Dept: RADIOLOGY | Facility: HOSPITAL | Age: 69
End: 2024-08-30
Payer: MEDICARE

## 2024-08-30 ENCOUNTER — HOSPITAL ENCOUNTER (EMERGENCY)
Facility: HOSPITAL | Age: 69
Discharge: HOME | End: 2024-08-30
Attending: STUDENT IN AN ORGANIZED HEALTH CARE EDUCATION/TRAINING PROGRAM
Payer: MEDICARE

## 2024-08-30 VITALS
WEIGHT: 225 LBS | HEART RATE: 96 BPM | DIASTOLIC BLOOD PRESSURE: 77 MMHG | OXYGEN SATURATION: 94 % | RESPIRATION RATE: 17 BRPM | SYSTOLIC BLOOD PRESSURE: 144 MMHG | BODY MASS INDEX: 32.21 KG/M2 | HEIGHT: 70 IN

## 2024-08-30 DIAGNOSIS — K70.31 ALCOHOLIC CIRRHOSIS OF LIVER WITH ASCITES (MULTI): ICD-10-CM

## 2024-08-30 DIAGNOSIS — M54.50 ACUTE LOW BACK PAIN WITHOUT SCIATICA, UNSPECIFIED BACK PAIN LATERALITY: Primary | ICD-10-CM

## 2024-08-30 LAB
ALBUMIN SERPL BCP-MCNC: 3.8 G/DL (ref 3.4–5)
ALP SERPL-CCNC: 224 U/L (ref 33–136)
ALT SERPL W P-5'-P-CCNC: 12 U/L (ref 10–52)
ANION GAP SERPL CALC-SCNC: 13 MMOL/L (ref 10–20)
APPEARANCE UR: CLEAR
AST SERPL W P-5'-P-CCNC: 49 U/L (ref 9–39)
BASOPHILS # BLD AUTO: 0.09 X10*3/UL (ref 0–0.1)
BASOPHILS NFR BLD AUTO: 0.7 %
BILIRUB SERPL-MCNC: 1.6 MG/DL (ref 0–1.2)
BILIRUB UR STRIP.AUTO-MCNC: NEGATIVE MG/DL
BUN SERPL-MCNC: 5 MG/DL (ref 6–23)
CALCIUM SERPL-MCNC: 9 MG/DL (ref 8.6–10.3)
CHLORIDE SERPL-SCNC: 96 MMOL/L (ref 98–107)
CO2 SERPL-SCNC: 25 MMOL/L (ref 21–32)
COLOR UR: ABNORMAL
CREAT SERPL-MCNC: 0.44 MG/DL (ref 0.5–1.3)
EGFRCR SERPLBLD CKD-EPI 2021: >90 ML/MIN/1.73M*2
EOSINOPHIL # BLD AUTO: 0.05 X10*3/UL (ref 0–0.7)
EOSINOPHIL NFR BLD AUTO: 0.4 %
ERYTHROCYTE [DISTWIDTH] IN BLOOD BY AUTOMATED COUNT: 13.2 % (ref 11.5–14.5)
GLUCOSE SERPL-MCNC: 124 MG/DL (ref 74–99)
GLUCOSE UR STRIP.AUTO-MCNC: NORMAL MG/DL
HCT VFR BLD AUTO: 36.7 % (ref 41–52)
HGB BLD-MCNC: 12.8 G/DL (ref 13.5–17.5)
HOLD SPECIMEN: NORMAL
IMM GRANULOCYTES # BLD AUTO: 0.05 X10*3/UL (ref 0–0.7)
IMM GRANULOCYTES NFR BLD AUTO: 0.4 % (ref 0–0.9)
KETONES UR STRIP.AUTO-MCNC: NEGATIVE MG/DL
LEUKOCYTE ESTERASE UR QL STRIP.AUTO: NEGATIVE
LIPASE SERPL-CCNC: 11 U/L (ref 9–82)
LYMPHOCYTES # BLD AUTO: 1.5 X10*3/UL (ref 1.2–4.8)
LYMPHOCYTES NFR BLD AUTO: 12.3 %
MCH RBC QN AUTO: 35.8 PG (ref 26–34)
MCHC RBC AUTO-ENTMCNC: 34.9 G/DL (ref 32–36)
MCV RBC AUTO: 103 FL (ref 80–100)
MONOCYTES # BLD AUTO: 1.07 X10*3/UL (ref 0.1–1)
MONOCYTES NFR BLD AUTO: 8.8 %
NEUTROPHILS # BLD AUTO: 9.46 X10*3/UL (ref 1.2–7.7)
NEUTROPHILS NFR BLD AUTO: 77.4 %
NITRITE UR QL STRIP.AUTO: NEGATIVE
NRBC BLD-RTO: 0 /100 WBCS (ref 0–0)
PH UR STRIP.AUTO: 7 [PH]
PLATELET # BLD AUTO: 246 X10*3/UL (ref 150–450)
POTASSIUM SERPL-SCNC: 4 MMOL/L (ref 3.5–5.3)
PROT SERPL-MCNC: 8.3 G/DL (ref 6.4–8.2)
PROT UR STRIP.AUTO-MCNC: NEGATIVE MG/DL
RBC # BLD AUTO: 3.58 X10*6/UL (ref 4.5–5.9)
RBC # UR STRIP.AUTO: NEGATIVE /UL
SODIUM SERPL-SCNC: 130 MMOL/L (ref 136–145)
SP GR UR STRIP.AUTO: <1.005
UROBILINOGEN UR STRIP.AUTO-MCNC: NORMAL MG/DL
WBC # BLD AUTO: 12.2 X10*3/UL (ref 4.4–11.3)

## 2024-08-30 PROCEDURE — 96374 THER/PROPH/DIAG INJ IV PUSH: CPT | Mod: 59

## 2024-08-30 PROCEDURE — 2500000004 HC RX 250 GENERAL PHARMACY W/ HCPCS (ALT 636 FOR OP/ED): Performed by: STUDENT IN AN ORGANIZED HEALTH CARE EDUCATION/TRAINING PROGRAM

## 2024-08-30 PROCEDURE — 85025 COMPLETE CBC W/AUTO DIFF WBC: CPT | Performed by: STUDENT IN AN ORGANIZED HEALTH CARE EDUCATION/TRAINING PROGRAM

## 2024-08-30 PROCEDURE — 74177 CT ABD & PELVIS W/CONTRAST: CPT | Mod: FOREIGN READ | Performed by: RADIOLOGY

## 2024-08-30 PROCEDURE — 2550000001 HC RX 255 CONTRASTS: Performed by: STUDENT IN AN ORGANIZED HEALTH CARE EDUCATION/TRAINING PROGRAM

## 2024-08-30 PROCEDURE — 84075 ASSAY ALKALINE PHOSPHATASE: CPT | Performed by: STUDENT IN AN ORGANIZED HEALTH CARE EDUCATION/TRAINING PROGRAM

## 2024-08-30 PROCEDURE — 81003 URINALYSIS AUTO W/O SCOPE: CPT | Performed by: STUDENT IN AN ORGANIZED HEALTH CARE EDUCATION/TRAINING PROGRAM

## 2024-08-30 PROCEDURE — 36415 COLL VENOUS BLD VENIPUNCTURE: CPT | Performed by: STUDENT IN AN ORGANIZED HEALTH CARE EDUCATION/TRAINING PROGRAM

## 2024-08-30 PROCEDURE — 72131 CT LUMBAR SPINE W/O DYE: CPT

## 2024-08-30 PROCEDURE — 2500000004 HC RX 250 GENERAL PHARMACY W/ HCPCS (ALT 636 FOR OP/ED)

## 2024-08-30 PROCEDURE — 74177 CT ABD & PELVIS W/CONTRAST: CPT

## 2024-08-30 PROCEDURE — 96375 TX/PRO/DX INJ NEW DRUG ADDON: CPT

## 2024-08-30 PROCEDURE — 72131 CT LUMBAR SPINE W/O DYE: CPT | Mod: FOREIGN READ | Performed by: RADIOLOGY

## 2024-08-30 PROCEDURE — 96361 HYDRATE IV INFUSION ADD-ON: CPT

## 2024-08-30 PROCEDURE — 99285 EMERGENCY DEPT VISIT HI MDM: CPT | Mod: 25

## 2024-08-30 PROCEDURE — 83690 ASSAY OF LIPASE: CPT | Performed by: STUDENT IN AN ORGANIZED HEALTH CARE EDUCATION/TRAINING PROGRAM

## 2024-08-30 RX ORDER — NAPROXEN 500 MG/1
500 TABLET ORAL
Qty: 30 TABLET | Refills: 0 | Status: SHIPPED | OUTPATIENT
Start: 2024-08-30 | End: 2024-09-14

## 2024-08-30 RX ORDER — HYDROMORPHONE HYDROCHLORIDE 1 MG/ML
1 INJECTION, SOLUTION INTRAMUSCULAR; INTRAVENOUS; SUBCUTANEOUS ONCE
Status: COMPLETED | OUTPATIENT
Start: 2024-08-30 | End: 2024-08-30

## 2024-08-30 RX ORDER — MORPHINE SULFATE 4 MG/ML
4 INJECTION INTRAVENOUS ONCE
Status: COMPLETED | OUTPATIENT
Start: 2024-08-30 | End: 2024-08-30

## 2024-08-30 RX ORDER — LIDOCAINE 50 MG/G
1 PATCH TOPICAL DAILY
Qty: 5 PATCH | Refills: 0 | Status: SHIPPED | OUTPATIENT
Start: 2024-08-30

## 2024-08-30 RX ORDER — OXYCODONE HYDROCHLORIDE 5 MG/1
10 TABLET ORAL EVERY 6 HOURS PRN
Qty: 12 TABLET | Refills: 0 | Status: SHIPPED | OUTPATIENT
Start: 2024-08-30 | End: 2024-09-02

## 2024-08-30 RX ORDER — ONDANSETRON HYDROCHLORIDE 2 MG/ML
4 INJECTION, SOLUTION INTRAVENOUS ONCE
Status: COMPLETED | OUTPATIENT
Start: 2024-08-30 | End: 2024-08-30

## 2024-08-30 RX ORDER — MORPHINE SULFATE 4 MG/ML
INJECTION INTRAVENOUS
Status: COMPLETED
Start: 2024-08-30 | End: 2024-08-30

## 2024-08-30 RX ORDER — MORPHINE SULFATE 4 MG/ML
4 INJECTION INTRAVENOUS ONCE
Status: DISCONTINUED | OUTPATIENT
Start: 2024-08-30 | End: 2024-08-30

## 2024-08-30 RX ORDER — ONDANSETRON HYDROCHLORIDE 2 MG/ML
INJECTION, SOLUTION INTRAVENOUS
Status: COMPLETED
Start: 2024-08-30 | End: 2024-08-30

## 2024-08-30 ASSESSMENT — COLUMBIA-SUICIDE SEVERITY RATING SCALE - C-SSRS
6. HAVE YOU EVER DONE ANYTHING, STARTED TO DO ANYTHING, OR PREPARED TO DO ANYTHING TO END YOUR LIFE?: NO
2. HAVE YOU ACTUALLY HAD ANY THOUGHTS OF KILLING YOURSELF?: NO
1. IN THE PAST MONTH, HAVE YOU WISHED YOU WERE DEAD OR WISHED YOU COULD GO TO SLEEP AND NOT WAKE UP?: NO

## 2024-08-30 ASSESSMENT — LIFESTYLE VARIABLES
HAVE YOU EVER FELT YOU SHOULD CUT DOWN ON YOUR DRINKING: NO
TOTAL SCORE: 0
EVER HAD A DRINK FIRST THING IN THE MORNING TO STEADY YOUR NERVES TO GET RID OF A HANGOVER: NO
EVER FELT BAD OR GUILTY ABOUT YOUR DRINKING: NO
HAVE PEOPLE ANNOYED YOU BY CRITICIZING YOUR DRINKING: NO

## 2024-08-30 ASSESSMENT — PAIN - FUNCTIONAL ASSESSMENT
PAIN_FUNCTIONAL_ASSESSMENT: 0-10
PAIN_FUNCTIONAL_ASSESSMENT: 0-10

## 2024-08-30 ASSESSMENT — PAIN SCALES - GENERAL
PAINLEVEL_OUTOF10: 0 - NO PAIN
PAINLEVEL_OUTOF10: 9

## 2024-08-30 ASSESSMENT — PAIN DESCRIPTION - LOCATION: LOCATION: ABDOMEN

## 2024-08-30 NOTE — ED PROVIDER NOTES
HPI   Chief Complaint   Patient presents with    Abdominal Pain     Per patient ABD pain radiating to back        69-year-old male with past medical history of lumbar disc disease presents to ED with abdominal and back pain.  Patient says symptoms started about 8 PM last night.  Says he was feeling ill and started having cough and then also had worsening abdominal pain.  Says now pain is mostly in the back area.  Low back.  He says he had 2 episodes of vomiting last night.  No diarrhea or constipation.  No weakness or numbness in the lower extremities.  No saddle anesthesia.  No urinary retention or incontinence.  No fecal incontinence.  No prior abdominal surgeries.  No dysuria, hematuria urinary frequency.              Patient History   No past medical history on file.  No past surgical history on file.  No family history on file.  Social History     Tobacco Use    Smoking status: Not on file    Smokeless tobacco: Not on file   Substance Use Topics    Alcohol use: Not on file    Drug use: Not on file       Physical Exam   ED Triage Vitals [08/30/24 0448]   Temp Heart Rate Respirations BP   -- (!) 112 20 161/83      Pulse Ox Temp src Heart Rate Source Patient Position   (!) 91 % -- -- --      BP Location FiO2 (%)     -- --       Physical Exam  Vitals and nursing note reviewed.   Constitutional:       General: He is not in acute distress.     Appearance: He is well-developed.   HENT:      Head: Normocephalic and atraumatic.   Eyes:      Conjunctiva/sclera: Conjunctivae normal.   Cardiovascular:      Rate and Rhythm: Normal rate and regular rhythm.      Heart sounds: No murmur heard.  Pulmonary:      Effort: Pulmonary effort is normal. No respiratory distress.      Breath sounds: Normal breath sounds.   Abdominal:      General: There is distension.      Palpations: Abdomen is soft.      Tenderness: There is no abdominal tenderness. There is no guarding or rebound.   Musculoskeletal:         General: No swelling.       Cervical back: Neck supple.   Skin:     General: Skin is warm and dry.      Capillary Refill: Capillary refill takes less than 2 seconds.   Neurological:      Mental Status: He is alert.      Comments: No weakness or numbness to the legs.  Normal hip flexion extension bilateral.  Normal knee flexion and extension, plantar and dorsi flexion bilateral.  Normal DP and PT pulse bilaterally.  Normal patellar and Achilles reflexes bilaterally.     Psychiatric:         Mood and Affect: Mood normal.           ED Course & MDM   Diagnoses as of 10/01/24 0021   Acute low back pain without sciatica, unspecified back pain laterality   Alcoholic cirrhosis of liver with ascites (Multi)                 No data recorded     Holliday Coma Scale Score: 15 (08/30/24 0446 : Andrey Wing RN)                           Medical Decision Making  HISTORIAN:  Patient    CHART REVIEW:  No pertinent finding    PT SUMMARY:  69-year-old male presents ED with abdominal pain that has now resolved and now just mostly having back pain.  Vital signs stable.    DDX:  MSK pain, osteoarthritis, fracture, radiculopathy, spinal infection      PLAN:  Will obtain CBC, CMP, lipase, CT abdomen pelvis, CT lumbar spine    DISPO/RE-EVAL:  Patient's labs so far show mild leukocytosis.  Also has some mild elevations in LFTs consistent with likely cirrhosis.  I have low suspicion for cauda equina syndrome as he has no neurological symptoms in his lower extremities with no urinary retention.  At this time patient be signed out to oncoming provider awaiting CT imaging results.  If results are otherwise negative and patient's pain is controlled he can ambulate normally can likely be discharged home with presumptive diagnosis of muscle skeletal back pain.          Procedure  Procedures     Liu Vallejo,   08/30/24 0646       Liu Vallejo,   10/01/24 0021

## 2024-09-17 ENCOUNTER — APPOINTMENT (OUTPATIENT)
Dept: GASTROENTEROLOGY | Facility: CLINIC | Age: 69
End: 2024-09-17
Payer: MEDICARE

## (undated) DEVICE — BANDAGE ADH W1XL3IN NAT FAB WVN FLX DURABLE N ADH PD SEAL

## (undated) DEVICE — 3M™ RED DOT™ MONITORING ELECTRODE WITH FOAM TAPE AND STICKY GEL 2560, 50/BAG, 20/CASE, 72/PLT: Brand: RED DOT™

## (undated) DEVICE — GLOVE ORANGE PI 7 1/2   MSG9075

## (undated) DEVICE — 12 ML SYRINGE,LUER-LOCK TIP: Brand: MONOJECT

## (undated) DEVICE — 3 ML SYRINGE LUER-LOCK TIP: Brand: MONOJECT

## (undated) DEVICE — NEEDLE HYPO 18GA L1.5IN PNK POLYPR HUB S STL THN WALL FILL

## (undated) DEVICE — 6 ML SYRINGE LUER-LOCK TIP: Brand: MONOJECT

## (undated) DEVICE — GAUZE,SPONGE,4"X4",12PLY,STERILE,LF,2'S: Brand: MEDLINE

## (undated) DEVICE — NEEDLE SPNL 22GA L3.5IN BLK HUB S STL REG WALL FIT STYL W/

## (undated) DEVICE — NEEDLE HYPO 25GA L1.5IN BLU POLYPR HUB S STL REG BVL STR

## (undated) DEVICE — Z DISCONTINUED APPLICATOR SURG PREP 0.35OZ 2% CHG 70% ISO ALC W/ HI LT